# Patient Record
Sex: MALE | Race: AMERICAN INDIAN OR ALASKA NATIVE
[De-identification: names, ages, dates, MRNs, and addresses within clinical notes are randomized per-mention and may not be internally consistent; named-entity substitution may affect disease eponyms.]

---

## 2017-02-06 ENCOUNTER — HOSPITAL ENCOUNTER (EMERGENCY)
Dept: HOSPITAL 5 - ED | Age: 42
Discharge: HOME | End: 2017-02-06
Payer: COMMERCIAL

## 2017-02-06 VITALS — DIASTOLIC BLOOD PRESSURE: 66 MMHG | SYSTOLIC BLOOD PRESSURE: 143 MMHG

## 2017-02-06 DIAGNOSIS — Z87.891: ICD-10-CM

## 2017-02-06 DIAGNOSIS — M54.5: Primary | ICD-10-CM

## 2017-02-06 DIAGNOSIS — Z79.899: ICD-10-CM

## 2017-02-06 DIAGNOSIS — Z79.82: ICD-10-CM

## 2017-02-06 DIAGNOSIS — I10: ICD-10-CM

## 2017-02-06 DIAGNOSIS — Z87.828: ICD-10-CM

## 2017-02-06 DIAGNOSIS — G89.29: ICD-10-CM

## 2017-02-06 PROCEDURE — 99282 EMERGENCY DEPT VISIT SF MDM: CPT
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
## 2017-07-06 ENCOUNTER — HOSPITAL ENCOUNTER (EMERGENCY)
Dept: HOSPITAL 5 - ED | Age: 42
LOS: 1 days | Discharge: HOME | End: 2017-07-07
Payer: SELF-PAY

## 2017-07-06 DIAGNOSIS — J45.909: ICD-10-CM

## 2017-07-06 DIAGNOSIS — Z79.82: ICD-10-CM

## 2017-07-06 DIAGNOSIS — I10: ICD-10-CM

## 2017-07-06 DIAGNOSIS — K59.00: ICD-10-CM

## 2017-07-06 DIAGNOSIS — R07.89: Primary | ICD-10-CM

## 2017-07-06 DIAGNOSIS — Z88.6: ICD-10-CM

## 2017-07-06 LAB
ANION GAP SERPL CALC-SCNC: 16 MMOL/L
BASOPHILS NFR BLD AUTO: 0.3 % (ref 0–1.8)
BUN SERPL-MCNC: 12 MG/DL (ref 9–20)
BUN/CREAT SERPL: 15 %
CALCIUM SERPL-MCNC: 9.2 MG/DL (ref 8.4–10.2)
CHLORIDE SERPL-SCNC: 101.1 MMOL/L (ref 98–107)
CO2 SERPL-SCNC: 27 MMOL/L (ref 22–30)
EOSINOPHIL NFR BLD AUTO: 1.5 % (ref 0–4.3)
GLUCOSE SERPL-MCNC: 83 MG/DL (ref 75–100)
HCT VFR BLD CALC: 45 % (ref 35.5–45.6)
HGB BLD-MCNC: 15.2 GM/DL (ref 11.8–15.2)
MCH RBC QN AUTO: 31 PG (ref 28–32)
MCHC RBC AUTO-ENTMCNC: 34 % (ref 32–34)
MCV RBC AUTO: 92 FL (ref 84–94)
PLATELET # BLD: 264 K/MM3 (ref 140–440)
POTASSIUM SERPL-SCNC: 4.2 MMOL/L (ref 3.6–5)
RBC # BLD AUTO: 4.88 M/MM3 (ref 3.65–5.03)
SODIUM SERPL-SCNC: 140 MMOL/L (ref 137–145)
WBC # BLD AUTO: 8.4 K/MM3 (ref 4.5–11)

## 2017-07-06 PROCEDURE — 71275 CT ANGIOGRAPHY CHEST: CPT

## 2017-07-06 PROCEDURE — 84484 ASSAY OF TROPONIN QUANT: CPT

## 2017-07-06 PROCEDURE — 80048 BASIC METABOLIC PNL TOTAL CA: CPT

## 2017-07-06 PROCEDURE — 71020: CPT

## 2017-07-06 PROCEDURE — 36415 COLL VENOUS BLD VENIPUNCTURE: CPT

## 2017-07-06 PROCEDURE — 99285 EMERGENCY DEPT VISIT HI MDM: CPT

## 2017-07-06 PROCEDURE — 85379 FIBRIN DEGRADATION QUANT: CPT

## 2017-07-06 PROCEDURE — 93010 ELECTROCARDIOGRAM REPORT: CPT

## 2017-07-06 PROCEDURE — 74174 CTA ABD&PLVS W/CONTRAST: CPT

## 2017-07-06 PROCEDURE — 85025 COMPLETE CBC W/AUTO DIFF WBC: CPT

## 2017-07-06 PROCEDURE — 93005 ELECTROCARDIOGRAM TRACING: CPT

## 2017-07-06 PROCEDURE — 96374 THER/PROPH/DIAG INJ IV PUSH: CPT
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
## 2017-07-06 NOTE — CAT SCAN REPORT
FINAL REPORT



PROCEDURE:  CT ANGIO ABDOMEN PELVIS



TECHNIQUE:  Computerized axial tomographic angiography of the

abdomen and pelvis was performed after the IV injection of

iodinated nonionic contrast. The image data was postprocessed

using 3D MIP technique. 



HISTORY:  chest pain radiating to back sharp 



COMPARISON:  No prior studies are available for comparison.



FINDINGS:  

Liver and spleen appear normal. Gallbladder, pancreas, and

adrenal glands appear normal. No renal or bladder abnormality is

seen. There is no free pelvic fluid. Appendix is not seen but no

pericecal inflammation is seen. There may be mild constipation

but no suggestion of colitis is seen. No evidence of bowel

obstruction is seen. 



The abdominal aorta is normal in size. No stenosis is seen in the

aorta or iliac arteries. There is no narrowing of the renal

arteries. Accessory renal artery is seen inferiorly on the right.

Main left renal vein passes anterior to the aorta but the

accessory left renal vein passes posterior to the aorta. No

stenosis is seen of the SMA. Distal SMA branches appear to

enhance normally. 



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

narrowed by approximately 90 percent. It could be a congenital

variant, as no plaque is seen in this region. There is seen

likely mild poststenotic dilation. Small hiatus hernia is seen.

Shotty reactive lymph nodes are seen in the retroperitoneum and

small bowel mesentery. 



IMPRESSION:  

Prominent narrowing is seen in the origin of the proximal 6

millimeters of the celiac axis. This may be a congenital variant

as no associated plaque or extrinsic mass effect is seen. 



There is likely mild diffuse constipation.

## 2017-07-06 NOTE — CAT SCAN REPORT
FINAL REPORT



PROCEDURE:  CT ANGIO CHEST



TECHNIQUE:  Computerized tomographic angiography of the chest was

performed after the IV injection of iodinated nonionic contrast

including image processing. The image data was postprocessed

using 2-dimensional multiplanar reformatted (MPR) and

3-dimensional (MIP and/or volume rendered) techniques. 



HISTORY:  chest pain radiating to back sharp 



COMPARISON:  No prior studies are available for comparison.



FINDINGS:  

Likely mild atelectasis is seen in the right lung base laterally.

No pneumothorax or pleural effusion is seen. No mediastinal

lymphadenopathy is seen. Heart and thoracic aorta are normal in

size. No aortic dissection is seen. Possible small hiatus hernia

is seen. Timing of contrast bolus in the pulmonary arteries is

slightly suboptimal. No pulmonary embolus is seen. 



IMPRESSION:  

No pulmonary embolus is seen. 



Possible small hiatus hernia is seen.

## 2017-07-07 VITALS — SYSTOLIC BLOOD PRESSURE: 122 MMHG | DIASTOLIC BLOOD PRESSURE: 77 MMHG

## 2017-07-07 NOTE — XRAY REPORT
Chest 2 views: Compared to 12/28/15.



History: Chest pain.



Findings:



Normal cardiomediastinal silhouette. Trachea is midline. No 

consolidation, pneumothorax or pleural effusion.



Impression:



No acute cardiopulmonary findings.

## 2017-09-04 ENCOUNTER — HOSPITAL ENCOUNTER (EMERGENCY)
Dept: HOSPITAL 5 - ED | Age: 42
LOS: 1 days | Discharge: HOME | End: 2017-09-05
Payer: SELF-PAY

## 2017-09-04 DIAGNOSIS — F17.210: ICD-10-CM

## 2017-09-04 DIAGNOSIS — M54.6: ICD-10-CM

## 2017-09-04 DIAGNOSIS — G89.29: Primary | ICD-10-CM

## 2017-09-04 DIAGNOSIS — Z79.82: ICD-10-CM

## 2017-09-04 DIAGNOSIS — I10: ICD-10-CM

## 2017-09-04 DIAGNOSIS — J45.909: ICD-10-CM

## 2017-09-04 PROCEDURE — 99282 EMERGENCY DEPT VISIT SF MDM: CPT

## 2017-09-05 VITALS — DIASTOLIC BLOOD PRESSURE: 95 MMHG | SYSTOLIC BLOOD PRESSURE: 144 MMHG
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
## 2017-10-25 ENCOUNTER — HOSPITAL ENCOUNTER (EMERGENCY)
Dept: HOSPITAL 5 - ED | Age: 42
Discharge: HOME | End: 2017-10-25
Payer: SELF-PAY

## 2017-10-25 VITALS — DIASTOLIC BLOOD PRESSURE: 87 MMHG | SYSTOLIC BLOOD PRESSURE: 134 MMHG

## 2017-10-25 DIAGNOSIS — S39.012A: Primary | ICD-10-CM
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
## 2017-10-25 DIAGNOSIS — Z88.8: ICD-10-CM

## 2017-10-25 DIAGNOSIS — I10: ICD-10-CM

## 2017-10-25 DIAGNOSIS — J45.909: ICD-10-CM

## 2017-10-25 PROCEDURE — 99283 EMERGENCY DEPT VISIT LOW MDM: CPT

## 2017-10-25 PROCEDURE — 72100 X-RAY EXAM L-S SPINE 2/3 VWS: CPT

## 2017-10-25 NOTE — XRAY REPORT
FINAL REPORT



PROCEDURE:  XR SPINE LUMBOSACRAL 2-3V



TECHNIQUE:  Lumbar spine radiographs, including AP, lateral, and

lumbosacral spot views. CPT 39164 







HISTORY:  spinal tenderness 



COMPARISON:  No prior studies are available for comparison.



FINDINGS:  

The vertebral body heights and alignment are maintained. Disc

spaces are preserved. No scoliosis. No focal osseous lesion is

seen. 



IMPRESSION:  

No acute abnormality is identified.

## 2017-12-22 ENCOUNTER — HOSPITAL ENCOUNTER (EMERGENCY)
Dept: HOSPITAL 5 - ED | Age: 42
Discharge: STILL ADMITTED OR ADMINISTRATIVE | End: 2017-12-22
Payer: COMMERCIAL

## 2017-12-22 VITALS — DIASTOLIC BLOOD PRESSURE: 79 MMHG | SYSTOLIC BLOOD PRESSURE: 135 MMHG

## 2017-12-22 DIAGNOSIS — R07.89: Primary | ICD-10-CM

## 2017-12-22 DIAGNOSIS — I10: ICD-10-CM

## 2017-12-22 DIAGNOSIS — J45.909: ICD-10-CM

## 2017-12-22 DIAGNOSIS — G89.29: ICD-10-CM

## 2017-12-22 DIAGNOSIS — M62.82: ICD-10-CM

## 2017-12-22 LAB
ANION GAP SERPL CALC-SCNC: 17 MMOL/L
BASOPHILS NFR BLD AUTO: 0.4 % (ref 0–1.8)
BUN SERPL-MCNC: 7 MG/DL (ref 9–20)
BUN/CREAT SERPL: 10 %
CALCIUM SERPL-MCNC: 8.9 MG/DL (ref 8.4–10.2)
CHLORIDE SERPL-SCNC: 102.1 MMOL/L (ref 98–107)
CK SERPL-CCNC: 3201 UNITS/L (ref 55–170)
CO2 SERPL-SCNC: 25 MMOL/L (ref 22–30)
EOSINOPHIL NFR BLD AUTO: 1.9 % (ref 0–4.3)
GLUCOSE SERPL-MCNC: 102 MG/DL (ref 75–100)
HCT VFR BLD CALC: 43.5 % (ref 35.5–45.6)
HGB BLD-MCNC: 14.9 GM/DL (ref 11.8–15.2)
MCH RBC QN AUTO: 32 PG (ref 28–32)
MCHC RBC AUTO-ENTMCNC: 34 % (ref 32–34)
MCV RBC AUTO: 93 FL (ref 84–94)
PLATELET # BLD: 219 K/MM3 (ref 140–440)
POTASSIUM SERPL-SCNC: 4 MMOL/L (ref 3.6–5)
RBC # BLD AUTO: 4.7 M/MM3 (ref 3.65–5.03)
SODIUM SERPL-SCNC: 140 MMOL/L (ref 137–145)
WBC # BLD AUTO: 13.3 K/MM3 (ref 4.5–11)

## 2017-12-22 PROCEDURE — 85379 FIBRIN DEGRADATION QUANT: CPT

## 2017-12-22 PROCEDURE — 71020: CPT

## 2017-12-22 PROCEDURE — 83880 ASSAY OF NATRIURETIC PEPTIDE: CPT

## 2017-12-22 PROCEDURE — 82550 ASSAY OF CK (CPK): CPT

## 2017-12-22 PROCEDURE — 94640 AIRWAY INHALATION TREATMENT: CPT

## 2017-12-22 PROCEDURE — 85025 COMPLETE CBC W/AUTO DIFF WBC: CPT

## 2017-12-22 PROCEDURE — 36415 COLL VENOUS BLD VENIPUNCTURE: CPT

## 2017-12-22 PROCEDURE — 82553 CREATINE MB FRACTION: CPT

## 2017-12-22 PROCEDURE — 96361 HYDRATE IV INFUSION ADD-ON: CPT

## 2017-12-22 PROCEDURE — 84484 ASSAY OF TROPONIN QUANT: CPT

## 2017-12-22 PROCEDURE — 87040 BLOOD CULTURE FOR BACTERIA: CPT

## 2017-12-22 PROCEDURE — 96365 THER/PROPH/DIAG IV INF INIT: CPT

## 2017-12-22 PROCEDURE — 93010 ELECTROCARDIOGRAM REPORT: CPT

## 2017-12-22 PROCEDURE — 80048 BASIC METABOLIC PNL TOTAL CA: CPT

## 2017-12-22 PROCEDURE — 99284 EMERGENCY DEPT VISIT MOD MDM: CPT

## 2017-12-22 PROCEDURE — 93005 ELECTROCARDIOGRAM TRACING: CPT

## 2017-12-22 NOTE — XRAY REPORT
CHEST 2 VIEWS



INDICATION: Shortness of breath.



COMPARISON: 11/05/2017



FINDINGS: PA and lateral chest radiographs again demonstrate normal 

cardiomediastinal silhouette, clear lungs and few shrapnels over the 

left chest.  Intact bones. 



CONCLUSION: No acute disease, stable.



Thank you for the opportunity to participate in this patient's care.

## 2017-12-22 NOTE — HISTORY AND PHYSICAL REPORT
History of Present Illness


Chief complaint: 





Im coughing, and my chest hurts 


History of present illness: 


41 YO Male HTN, Asthma presents to ED for evaluation. Pt seen and evaluated in 

ED for atypical chest pain and productive cough suspicious for Atypical 

pneumonia. Pt treated with cardiac enzyme, ekg, and telemetry monitoring which 

was not indicative of acute ischemia. Pt treated with Iv abx and IVF with 

resolution of symptoms. Pt medically optimized and back to usual state of 

health. Pt discharged home and instructed to f/u pcp 1wk. 





Past History


Past Medical History: other (asthma)


Past Surgical History: No surgical history, Other (reviewed)


Social history: , lives with family.  denies: smoking, alcohol abuse, 

prescription drug abuse


Family history: no significant family history





Medications and Allergies


 Allergies











Allergy/AdvReac Type Severity Reaction Status Date / Time


 


ibuprofen Allergy  Unknown Verified 11/07/17 14:56











 Home Medications











 Medication  Instructions  Recorded  Confirmed  Last Taken  Type


 


Ciprofloxacin HCl [Ciprofloxacin 500 mg PO Q12H #14 tab 12/22/17  Unknown Rx





TAB]     











Active Meds: 


Active Medications





Sodium Chloride (Nacl 0.9% 1000 Ml)  1,000 mls @ 250 mls/hr IV ONCE ONE


   Stop: 12/22/17 17:56


   Last Admin: 12/22/17 15:15 Dose:  250 mls/hr











Review of Systems


Constitutional: no weight loss, no weight gain, no fever, no chills


Ears, nose, mouth and throat: no ear pain, no ear discharge, no tinnitis, no 

decreased hearing, no nose pain, no nasal congestion


Respiratory: cough, no hemoptysis, no shortness of breath, no dyspnea on 

exertion


Gastrointestinal: no abdominal pain, no nausea, no vomiting, no diarrhea


Genitourinary Male: no hematuria, no flank pain, no discharge, no urinary 

frequency


Rectal: no pain, no incontinence, no bleeding


Musculoskeletal: no neck stiffness, no neck pain, no shooting arm pain, no arm 

numbness/tingling, no low back pain


Integumentary: no rash, no pruritis, no redness, no sores, no wounds, no 

jaundice


Neurological: no transient paralysis, no paralysis, no weakness, no parathesias

, no numbness, no tingling, no seizures


Psychiatric: no anxiety, no memory loss, no change in sleep habits, no 

hypersomnia, no change in appetite, no change in libido


Endocrine: no cold intolerance, no heat intolerance, no polyphagia, no 

excessive thirst, no polydipsia


Hematologic/Lymphatic: no easy bruising, no easy bleeding


Allergic/Immunologic: no urticaria, no allergic rhinitis, no wheezing





Exam





- Constitutional


Vitals: 


 











Temp Pulse Resp BP Pulse Ox


 


 99.2 F   86   16   126/84   97 


 


 12/22/17 13:19  12/22/17 14:53  12/22/17 13:36  12/22/17 14:53  12/22/17 13:19











General appearance: Present: no acute distress, well-nourished





- EENT


Eyes: Present: PERRL


ENT: hearing intact, clear oral mucosa





- Neck


Neck: Present: supple, normal ROM





- Respiratory


Respiratory effort: normal


Respiratory: bilateral: CTA





- Cardiovascular


Heart Sounds: Present: S1 & S2.  Absent: rub, click





- Extremities


Extremities: pulses symmetrical, No edema


Peripheral Pulses: within normal limits





- Abdominal


General gastrointestinal: Present: soft, non-tender, non-distended, normal 

bowel sounds


Male genitourinary: Present: normal





- Integumentary


Integumentary: Present: clear, warm, dry





- Musculoskeletal


Musculoskeletal: gait normal, strength equal bilaterally





- Psychiatric


Psychiatric: appropriate mood/affect, intact judgment & insight





- Neurologic


Neurologic: CNII-XII intact, moves all extremities





Results





- Labs


CBC & Chem 7: 


 12/22/17 08:29





 12/22/17 08:29


Labs: 


 Abnormal lab results











  12/22/17 12/22/17 12/22/17 Range/Units





  08:29 08:29 Unknown 


 


WBC  13.3 H    (4.5-11.0)  K/mm3


 


Lymph % (Auto)  9.4 L    (13.4-35.0)  %


 


Seg Neutrophils %  82.4 H    (40.0-70.0)  %


 


Seg Neutrophils #  11.0 H    (1.8-7.7)  K/mm3


 


BUN   7 L   (9-20)  mg/dL


 


Creatinine   0.7 L   (0.8-1.5)  mg/dL


 


Glucose   102 H   ()  mg/dL


 


Total Creatine Kinase    3201 H  ()  units/L


 


CK-MB (CK-2)    11.2 H  (0.0-4.0)  ng/mL














Assessment and Plan





- Patient Problems


(1) Atypical pneumonia


Current Visit: Yes   Status: Acute   


Plan to address problem: 


IV abx, CXR, Pt discharged home with oral abx, and instructed to f/u pcp within 

1wk. 








(2) Atypical chest pain


Current Visit: No   Status: Acute   


Plan to address problem: 


secondary to atypical pneumonia, and persistent cough.
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
## 2018-01-04 ENCOUNTER — HOSPITAL ENCOUNTER (EMERGENCY)
Dept: HOSPITAL 5 - ED | Age: 43
Discharge: LEFT BEFORE DISCHARGE | End: 2018-01-04
Payer: SELF-PAY

## 2018-01-04 DIAGNOSIS — Z53.21: Primary | ICD-10-CM

## 2018-03-31 ENCOUNTER — HOSPITAL ENCOUNTER (INPATIENT)
Dept: HOSPITAL 5 - ED | Age: 43
LOS: 2 days | Discharge: HOME | DRG: 392 | End: 2018-04-02
Attending: INTERNAL MEDICINE | Admitting: INTERNAL MEDICINE
Payer: COMMERCIAL

## 2018-03-31 DIAGNOSIS — E86.0: ICD-10-CM

## 2018-03-31 DIAGNOSIS — E87.2: ICD-10-CM

## 2018-03-31 DIAGNOSIS — J45.909: ICD-10-CM

## 2018-03-31 DIAGNOSIS — M54.10: ICD-10-CM

## 2018-03-31 DIAGNOSIS — Z87.891: ICD-10-CM

## 2018-03-31 DIAGNOSIS — I10: ICD-10-CM

## 2018-03-31 DIAGNOSIS — Z88.8: ICD-10-CM

## 2018-03-31 DIAGNOSIS — M54.32: ICD-10-CM

## 2018-03-31 DIAGNOSIS — I77.4: Primary | ICD-10-CM

## 2018-03-31 LAB
APTT BLD: 27.2 SEC. (ref 24.2–36.6)
BACTERIA #/AREA URNS HPF: (no result) /HPF
BASOPHILS # (AUTO): 0 K/MM3 (ref 0–0.1)
BASOPHILS NFR BLD AUTO: 0.5 % (ref 0–1.8)
BILIRUB UR QL STRIP: (no result)
BLOOD UR QL VISUAL: (no result)
BUN SERPL-MCNC: 10 MG/DL (ref 9–20)
BUN/CREAT SERPL: 13 %
CALCIUM SERPL-MCNC: 8.9 MG/DL (ref 8.4–10.2)
EOSINOPHIL # BLD AUTO: 0.2 K/MM3 (ref 0–0.4)
EOSINOPHIL NFR BLD AUTO: 2.3 % (ref 0–4.3)
HCT VFR BLD CALC: 50.7 % (ref 35.5–45.6)
HEMOLYSIS INDEX: 10
HGB BLD-MCNC: 17.4 GM/DL (ref 11.8–15.2)
INR PPP: 0.93 (ref 0.87–1.13)
LYMPHOCYTES # BLD AUTO: 2.5 K/MM3 (ref 1.2–5.4)
LYMPHOCYTES NFR BLD AUTO: 29.2 % (ref 13.4–35)
MCH RBC QN AUTO: 31 PG (ref 28–32)
MCHC RBC AUTO-ENTMCNC: 34 % (ref 32–34)
MCV RBC AUTO: 91 FL (ref 84–94)
MONOCYTES # (AUTO): 1 K/MM3 (ref 0–0.8)
MONOCYTES % (AUTO): 11.2 % (ref 0–7.3)
MUCOUS THREADS #/AREA URNS HPF: (no result) /HPF
PH UR STRIP: 5 [PH] (ref 5–7)
PLATELET # BLD: 247 K/MM3 (ref 140–440)
PROT UR STRIP-MCNC: (no result) MG/DL
RBC # BLD AUTO: 5.56 M/MM3 (ref 3.65–5.03)
RBC #/AREA URNS HPF: 14 /HPF (ref 0–6)
UROBILINOGEN UR-MCNC: < 2 MG/DL (ref ?–2)
WBC #/AREA URNS HPF: 1 /HPF (ref 0–6)

## 2018-03-31 PROCEDURE — 86850 RBC ANTIBODY SCREEN: CPT

## 2018-03-31 PROCEDURE — 85025 COMPLETE CBC W/AUTO DIFF WBC: CPT

## 2018-03-31 PROCEDURE — 85610 PROTHROMBIN TIME: CPT

## 2018-03-31 PROCEDURE — 36415 COLL VENOUS BLD VENIPUNCTURE: CPT

## 2018-03-31 PROCEDURE — 86900 BLOOD TYPING SEROLOGIC ABO: CPT

## 2018-03-31 PROCEDURE — 85730 THROMBOPLASTIN TIME PARTIAL: CPT

## 2018-03-31 PROCEDURE — 86901 BLOOD TYPING SEROLOGIC RH(D): CPT

## 2018-03-31 PROCEDURE — 74174 CTA ABD&PLVS W/CONTRAST: CPT

## 2018-03-31 PROCEDURE — 80048 BASIC METABOLIC PNL TOTAL CA: CPT

## 2018-03-31 PROCEDURE — 72040 X-RAY EXAM NECK SPINE 2-3 VW: CPT

## 2018-03-31 PROCEDURE — 96374 THER/PROPH/DIAG INJ IV PUSH: CPT

## 2018-03-31 PROCEDURE — 82140 ASSAY OF AMMONIA: CPT

## 2018-03-31 PROCEDURE — 82550 ASSAY OF CK (CPK): CPT

## 2018-03-31 PROCEDURE — 72148 MRI LUMBAR SPINE W/O DYE: CPT

## 2018-03-31 PROCEDURE — 72100 X-RAY EXAM L-S SPINE 2/3 VWS: CPT

## 2018-03-31 PROCEDURE — 96361 HYDRATE IV INFUSION ADD-ON: CPT

## 2018-03-31 PROCEDURE — 96375 TX/PRO/DX INJ NEW DRUG ADDON: CPT

## 2018-03-31 PROCEDURE — 81001 URINALYSIS AUTO W/SCOPE: CPT

## 2018-03-31 PROCEDURE — 80053 COMPREHEN METABOLIC PANEL: CPT

## 2018-03-31 RX ADMIN — Medication SCH ML: at 22:59

## 2018-03-31 RX ADMIN — DEXTROSE AND SODIUM CHLORIDE SCH MLS/HR: 5; .9 INJECTION, SOLUTION INTRAVENOUS at 22:58

## 2018-03-31 RX ADMIN — HYDROMORPHONE HYDROCHLORIDE PRN MG: 1 INJECTION, SOLUTION INTRAMUSCULAR; INTRAVENOUS; SUBCUTANEOUS at 22:58
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
## 2018-03-31 NOTE — XRAY REPORT
AP AND LATERAL LUMBOSACRAL SPINE:



History: Lower back pain, sciatica



The vertebral bodies are well mineralized and normal in alignment

and vertebral height with well preserved interspace distances.  The 

visualized portions of the posterior elements are normal.



IMPRESSION:

Lumbar spine within normal limits.

## 2018-03-31 NOTE — CAT SCAN REPORT
FINAL REPORT



EXAM:  CT ANGIO ABDOMEN PELVIS



HISTORY:  lower abd pain, hx of celiac stenosis 



TECHNIQUE:  Multiple sections through the abdomen after IV

contrast 



Multiple sections through the pelvis after IV contrast



CT angiographic image post processing of the abdomen and pelvis.



PRIORS:  7/6/2017



FINDINGS:  

Normal-appearing liver, gallbladder, adrenals, pancreas, and

spleen normal caliber IVC. Normal-appearing kidneys and ureters



Small fat containing umbilical hernia very small fat containing

inguinal hernia bilaterally. No retroperitoneal adenopathy. No

evidence of mesenteric mass. Normal-appearing stomach and

duodenum. No small bowel distention in the abdomen and pelvis.



No pelvic free fluid. Normal-appearing urinary bladder, prostate,

seminal vesicles, and rectum. No definite sigmoid colon

abnormality. No gross ascites, free air, or colonic distention.

Normal-appearing cecum and terminal ileum. Appendix not visible.

Suture line at the cecal tip may reflect prior appendectomy.



CTA images demonstrate normal caliber abdominal aorta without

aneurysm or dissection. Diffusely patent SMA and renal arteries.

Patent ROJELIO.



Once again, there is approximately 90 % diameter severe narrowing

at the proximal aspect of the celiac artery involving a segment

of approximately 6 mm long. The narrowing is noted just distal to

the origin



IMPRESSION:  

Again noted is severe narrowing at the proximal celiac artery,

approximately 90 % diameter over a 6 mm long segment



Small fat containing umbilical hernia and very small fat

containing inguinal hernias
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
## 2018-04-01 LAB
ALBUMIN SERPL-MCNC: 3.8 G/DL (ref 3.9–5)
ALT SERPL-CCNC: 19 UNITS/L (ref 7–56)
BASOPHILS # (AUTO): 0 K/MM3 (ref 0–0.1)
BASOPHILS NFR BLD AUTO: 0.5 % (ref 0–1.8)
BUN SERPL-MCNC: 8 MG/DL (ref 9–20)
BUN/CREAT SERPL: 10 %
CALCIUM SERPL-MCNC: 8.6 MG/DL (ref 8.4–10.2)
EOSINOPHIL # BLD AUTO: 0.1 K/MM3 (ref 0–0.4)
EOSINOPHIL NFR BLD AUTO: 1.9 % (ref 0–4.3)
HCT VFR BLD CALC: 51.3 % (ref 35.5–45.6)
HEMOLYSIS INDEX: 19
HGB BLD-MCNC: 17.3 GM/DL (ref 11.8–15.2)
LYMPHOCYTES # BLD AUTO: 2.5 K/MM3 (ref 1.2–5.4)
LYMPHOCYTES NFR BLD AUTO: 33.8 % (ref 13.4–35)
MCH RBC QN AUTO: 31 PG (ref 28–32)
MCHC RBC AUTO-ENTMCNC: 34 % (ref 32–34)
MCV RBC AUTO: 93 FL (ref 84–94)
MONOCYTES # (AUTO): 0.7 K/MM3 (ref 0–0.8)
MONOCYTES % (AUTO): 9.9 % (ref 0–7.3)
PLATELET # BLD: 245 K/MM3 (ref 140–440)
RBC # BLD AUTO: 5.5 M/MM3 (ref 3.65–5.03)

## 2018-04-01 RX ADMIN — HYDROMORPHONE HYDROCHLORIDE PRN MG: 1 INJECTION, SOLUTION INTRAMUSCULAR; INTRAVENOUS; SUBCUTANEOUS at 21:41

## 2018-04-01 RX ADMIN — LOSARTAN POTASSIUM SCH MG: 50 TABLET, FILM COATED ORAL at 09:33

## 2018-04-01 RX ADMIN — DEXTROSE AND SODIUM CHLORIDE SCH MLS/HR: 5; .9 INJECTION, SOLUTION INTRAVENOUS at 06:44

## 2018-04-01 RX ADMIN — HYDROMORPHONE HYDROCHLORIDE PRN MG: 1 INJECTION, SOLUTION INTRAMUSCULAR; INTRAVENOUS; SUBCUTANEOUS at 14:59

## 2018-04-01 RX ADMIN — HYDROCODONE BITARTRATE AND ACETAMINOPHEN PRN EACH: 5; 325 TABLET ORAL at 10:01

## 2018-04-01 RX ADMIN — Medication SCH ML: at 09:34

## 2018-04-01 RX ADMIN — Medication SCH ML: at 21:39

## 2018-04-01 RX ADMIN — HYDROMORPHONE HYDROCHLORIDE PRN MG: 1 INJECTION, SOLUTION INTRAMUSCULAR; INTRAVENOUS; SUBCUTANEOUS at 04:35

## 2018-04-01 RX ADMIN — DEXTROSE AND SODIUM CHLORIDE SCH MLS/HR: 5; .9 INJECTION, SOLUTION INTRAVENOUS at 14:53

## 2018-04-01 NOTE — PROGRESS NOTE
Assessment and Plan


Assessment and plan: 


--Celiac artery stenosis; on CTA abdomen


Vascular evaluated the patient, no intervention needed advised aspirin





--Low back pain; possible radiculopathy


Pending MRI lumbosacral spine, and medications


Ambulate as tolerated





--Hypertension; moderate control


Continue current antihypertensives and when necessary medications





--DVT prophylaxis; heparin





Closely monitor the patient and adjust management as needed


Patient may need to follow neurosurgery/orthopedic for further evaluation of 

his low back pain


Possible discharge in 1-2 days if stabl





History


Interval history: 


Patient seen and examined medical records reviewed


Vascular evaluation and recommendations noted and appreciated


MRI lumbosacral spine pending


Patiently slightly but pain slightly improved


Metabolic oriented 3 not in acute distress








Hospitalist Physical





- Constitutional


Vitals: 


 











Temp Pulse Resp BP Pulse Ox


 


 98.3 F   54 L  20   140/75   100 


 


 04/01/18 08:00  04/01/18 08:00  04/01/18 08:00  04/01/18 09:33  04/01/18 08:00











General appearance: Present: no acute distress, well-nourished, obese





- EENT


Eyes: Present: PERRL, EOM intact





- Neck


Neck: Present: supple, normal ROM





- Respiratory


Respiratory effort: normal


Respiratory: bilateral: diminished, negative: rales, rhonchi, wheezing





- Cardiovascular


Rhythm: regular


Heart Sounds: Present: S1 & S2





- Extremities


Extremities: no ischemia, No edema





- Abdominal


General gastrointestinal: soft, non-tender, non-distended, normal bowel sounds





- Integumentary


Integumentary: Present: clear, warm





- Psychiatric


Psychiatric: appropriate mood/affect, cooperative





- Neurologic


Neurologic: CNII-XII intact, moves all extremities





Results





- Labs


CBC & Chem 7: 


 04/01/18 05:46





 04/01/18 05:46


Labs: 


 Laboratory Last Values











WBC  7.3 K/mm3 (4.5-11.0)   04/01/18  05:46    


 


RBC  5.50 M/mm3 (3.65-5.03)  H  04/01/18  05:46    


 


Hgb  17.3 gm/dl (11.8-15.2)  H  04/01/18  05:46    


 


Hct  51.3 % (35.5-45.6)  H  04/01/18  05:46    


 


MCV  93 fl (84-94)   04/01/18  05:46    


 


MCH  31 pg (28-32)   04/01/18  05:46    


 


MCHC  34 % (32-34)   04/01/18  05:46    


 


RDW  14.1 % (13.2-15.2)   04/01/18  05:46    


 


Plt Count  245 K/mm3 (140-440)   04/01/18  05:46    


 


Lymph % (Auto)  33.8 % (13.4-35.0)   04/01/18  05:46    


 


Mono % (Auto)  9.9 % (0.0-7.3)  H  04/01/18  05:46    


 


Eos % (Auto)  1.9 % (0.0-4.3)   04/01/18  05:46    


 


Baso % (Auto)  0.5 % (0.0-1.8)   04/01/18  05:46    


 


Lymph #  2.5 K/mm3 (1.2-5.4)   04/01/18  05:46    


 


Mono #  0.7 K/mm3 (0.0-0.8)   04/01/18  05:46    


 


Eos #  0.1 K/mm3 (0.0-0.4)   04/01/18  05:46    


 


Baso #  0.0 K/mm3 (0.0-0.1)   04/01/18  05:46    


 


Seg Neutrophils %  53.9 % (40.0-70.0)   04/01/18  05:46    


 


Seg Neutrophils #  3.9 K/mm3 (1.8-7.7)   04/01/18  05:46    


 


PT  12.9 Sec. (12.2-14.9)   03/31/18  13:28    


 


INR  0.93  (0.87-1.13)   03/31/18  13:28    


 


APTT  27.2 Sec. (24.2-36.6)   03/31/18  13:28    


 


Sodium  142 mmol/L (137-145)   04/01/18  05:46    


 


Potassium  4.0 mmol/L (3.6-5.0)   04/01/18  05:46    


 


Chloride  100.7 mmol/L ()   04/01/18  05:46    


 


Carbon Dioxide  30 mmol/L (22-30)   04/01/18  05:46    


 


Anion Gap  15 mmol/L  04/01/18  05:46    


 


BUN  8 mg/dL (9-20)  L  04/01/18  05:46    


 


Creatinine  0.8 mg/dL (0.8-1.5)   04/01/18  05:46    


 


Estimated GFR  > 60 ml/min  04/01/18  05:46    


 


BUN/Creatinine Ratio  10 %  04/01/18  05:46    


 


Glucose  87 mg/dL ()   04/01/18  05:46    


 


Lactic Acid  2.60 mmol/L (0.7-2.0)  H*  03/31/18  16:52    


 


Calcium  8.6 mg/dL (8.4-10.2)   04/01/18  05:46    


 


Total Bilirubin  0.80 mg/dL (0.1-1.2)   04/01/18  05:46    


 


AST  20 units/L (5-40)   04/01/18  05:46    


 


ALT  19 units/L (7-56)   04/01/18  05:46    


 


Alkaline Phosphatase  60 units/L ()   04/01/18  05:46    


 


Total Creatine Kinase  294 units/L ()  H  03/31/18  11:39    


 


Total Protein  6.9 g/dL (6.3-8.2)   04/01/18  05:46    


 


Albumin  3.8 g/dL (3.9-5)  L  04/01/18  05:46    


 


Albumin/Globulin Ratio  1.2 %  04/01/18  05:46    


 


Urine Color  Yellow  (Yellow)   03/31/18  10:34    


 


Urine Turbidity  Clear  (Clear)   03/31/18  10:34    


 


Urine pH  5.0  (5.0-7.0)   03/31/18  10:34    


 


Ur Specific Gravity  1.020  (1.003-1.030)   03/31/18  10:34    


 


Urine Protein  <15 mg/dl mg/dL (Negative)   03/31/18  10:34    


 


Urine Glucose (UA)  Neg mg/dL (Negative)   03/31/18  10:34    


 


Urine Ketones  Neg mg/dL (Negative)   03/31/18  10:34    


 


Urine Blood  Mod  (Negative)   03/31/18  10:34    


 


Urine Nitrite  Neg  (Negative)   03/31/18  10:34    


 


Urine Bilirubin  Neg  (Negative)   03/31/18  10:34    


 


Urine Urobilinogen  < 2.0 mg/dL (<2.0)   03/31/18  10:34    


 


Ur Leukocyte Esterase  Neg  (Negative)   03/31/18  10:34    


 


Urine WBC (Auto)  1.0 /HPF (0.0-6.0)   03/31/18  10:34    


 


Urine RBC (Auto)  14.0 /HPF (0.0-6.0)   03/31/18  10:34    


 


U Epithel Cells (Auto)  < 1.0 /HPF (0-13.0)   03/31/18  10:34    


 


Urine Bacteria (Auto)  1+ /HPF (Negative)   03/31/18  10:34    


 


Urine Mucus  Few /HPF  03/31/18  10:34    


 


Blood Type  A POSITIVE   03/31/18  13:28    


 


Antibody Screen  Negative   03/31/18  13:28
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
## 2018-04-02 VITALS — DIASTOLIC BLOOD PRESSURE: 82 MMHG | SYSTOLIC BLOOD PRESSURE: 138 MMHG

## 2018-04-02 RX ADMIN — HYDROMORPHONE HYDROCHLORIDE PRN MG: 1 INJECTION, SOLUTION INTRAMUSCULAR; INTRAVENOUS; SUBCUTANEOUS at 03:27

## 2018-04-02 RX ADMIN — LOSARTAN POTASSIUM SCH MG: 50 TABLET, FILM COATED ORAL at 11:02

## 2018-04-02 RX ADMIN — HYDROMORPHONE HYDROCHLORIDE PRN MG: 1 INJECTION, SOLUTION INTRAMUSCULAR; INTRAVENOUS; SUBCUTANEOUS at 08:59

## 2018-04-02 RX ADMIN — Medication SCH ML: at 09:00

## 2018-04-02 RX ADMIN — DEXTROSE AND SODIUM CHLORIDE SCH MLS/HR: 5; .9 INJECTION, SOLUTION INTRAVENOUS at 03:19

## 2018-04-02 RX ADMIN — HYDROCODONE BITARTRATE AND ACETAMINOPHEN PRN EACH: 5; 325 TABLET ORAL at 17:19

## 2018-04-02 NOTE — MAGNETIC RESONANCE REPORT
FINAL REPORT



EXAM:  MR LUMBAR SPINE WO CON



HISTORY:  Lumbar radiculopathy 



TECHNIQUE:  MRI lumbar spine without contrast 



PRIORS:  None.



FINDINGS:  

Distal cord and conus demonstrate normal signal intensity 



From T12-L1 through L4-5 there is no focal disc herniation or

bulge there is no evidence for canal or foraminal stenosis 



There is a moderate broad-based central posterior disc herniation

of the protrusion type at L5-S1. Disc contacts the anterior

aspect of the thecal sac and the S1 nerve roots at the lateral

recesses. 



There is signal change within S1 vertebral body which is low

signal on T1 and high signal on T2 representing Modic type 1

changes 



There is a small focus of endplate signal seen at the posterior

inferior aspect of the L5 vertebral body consistent with Modic

type 2 changes. 



There is no evidence for increased signal within the disc to

suggest presence of discitis. 



IMPRESSION:  

Posterior disc herniation at L5-S1 with Modic 1 signal changes S1

vertebral body and small focus of Modic type 2 signal change L5

vertebral body.
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
## 2018-04-02 NOTE — DISCHARGE SUMMARY
Providers





- Providers


Date of Admission: 


03/31/18 16:39





Date of discharge: 04/02/18


Attending physician: 


AMY J KOCHERLA





 





03/31/18 21:02


Consult to Physician [CONS] Routine 


   Comment: 


   Consulting Provider: JOSSE URBAN


   Physician Instructions: 


   Reason For Exam: Celiac artery occlusion











Primary care physician: 


PRIMARY CARE MD








Hospitalization


Condition: Stable


Disposition: DC-01 TO HOME OR SELFCARE


Time spent for discharge: 31 min





Core Measure Documentation





- Palliative Care


Palliative Care/ Comfort Measures: Not Applicable





- Core Measures


Any of the following diagnoses?: none





Exam





- Constitutional


Vitals: 


 











Temp Pulse Resp BP Pulse Ox


 


 98.5 F   58 L  20   147/62   99 


 


 04/02/18 11:27  04/02/18 11:27  04/02/18 11:27  04/02/18 11:27  04/02/18 11:27











General appearance: Present: no acute distress, well-nourished





- EENT


Eyes: Present: PERRL, EOM intact





- Neck


Neck: Present: supple, normal ROM





- Respiratory


Respiratory effort: normal


Respiratory: bilateral: diminished, negative: rales, rhonchi, wheezing





- Cardiovascular


Rhythm: regular


Heart Sounds: Present: S1 & S2





- Extremities


Extremities: no ischemia, No edema





- Abdominal


General gastrointestinal: Present: soft, non-tender, non-distended, normal 

bowel sounds





- Integumentary


Integumentary: Present: clear, warm





- Musculoskeletal


Musculoskeletal: strength equal bilaterally





- Psychiatric


Psychiatric: appropriate mood/affect, cooperative





- Neurologic


Neurologic: CNII-XII intact, moves all extremities





Plan


Activity: no restrictions


Diet: regular


Additional Instructions: Advised to see private neurosurgeon or orthopedic 

surgeon.  For further evaluation and management


Follow up with: 


[facility] [Outside] - 3-5 Days


[facility] [Outside] - 3-5 Days


USHA SHAFFER MD [Staff Physician] - 3-5 Days


ARABELLA LACY MD [Staff Physician] - 7 Days


Prescriptions: 


HYDROcodone/APAP 5-325 [Norco 5-325 mg TAB] 1 each PO BID PRN #10 tablet


 PRN Reason: Pain, Moderate (4-6)


Losartan [Cozaar] 50 mg PO QDAY #30 tablet

## 2018-04-26 ENCOUNTER — HOSPITAL ENCOUNTER (EMERGENCY)
Dept: HOSPITAL 5 - ED | Age: 43
Discharge: HOME | End: 2018-04-26
Payer: COMMERCIAL

## 2018-04-26 VITALS — DIASTOLIC BLOOD PRESSURE: 103 MMHG | SYSTOLIC BLOOD PRESSURE: 153 MMHG

## 2018-04-26 DIAGNOSIS — M54.32: Primary | ICD-10-CM

## 2018-04-26 DIAGNOSIS — Z88.8: ICD-10-CM

## 2018-04-26 PROCEDURE — 99282 EMERGENCY DEPT VISIT SF MDM: CPT

## 2020-02-05 ENCOUNTER — HOSPITAL ENCOUNTER (EMERGENCY)
Dept: HOSPITAL 5 - ED | Age: 45
Discharge: HOME | End: 2020-02-05
Payer: SELF-PAY

## 2020-02-05 VITALS — SYSTOLIC BLOOD PRESSURE: 116 MMHG | DIASTOLIC BLOOD PRESSURE: 76 MMHG

## 2020-02-05 DIAGNOSIS — Z88.6: ICD-10-CM

## 2020-02-05 DIAGNOSIS — Z79.899: ICD-10-CM

## 2020-02-05 DIAGNOSIS — F11.10: ICD-10-CM

## 2020-02-05 DIAGNOSIS — J45.909: ICD-10-CM

## 2020-02-05 DIAGNOSIS — F32.9: ICD-10-CM

## 2020-02-05 DIAGNOSIS — R41.82: Primary | ICD-10-CM

## 2020-02-05 DIAGNOSIS — I10: ICD-10-CM

## 2020-02-05 LAB
ALBUMIN SERPL-MCNC: 3.8 G/DL (ref 3.9–5)
ALT SERPL-CCNC: 26 UNITS/L (ref 7–56)
BASOPHILS # (AUTO): 0.1 K/MM3 (ref 0–0.1)
BASOPHILS NFR BLD AUTO: 0.7 % (ref 0–1.8)
BENZODIAZEPINES SCREEN,URINE: (no result)
BILIRUB UR QL STRIP: (no result)
BLOOD UR QL VISUAL: (no result)
BUN SERPL-MCNC: 15 MG/DL (ref 9–20)
BUN/CREAT SERPL: 19 %
CALCIUM SERPL-MCNC: 8.4 MG/DL (ref 8.4–10.2)
EOSINOPHIL # BLD AUTO: 0.1 K/MM3 (ref 0–0.4)
EOSINOPHIL NFR BLD AUTO: 0.8 % (ref 0–4.3)
HCT VFR BLD CALC: 40.9 % (ref 35.5–45.6)
HEMOLYSIS INDEX: 17
HGB BLD-MCNC: 13.8 GM/DL (ref 11.8–15.2)
LYMPHOCYTES # BLD AUTO: 1.1 K/MM3 (ref 1.2–5.4)
LYMPHOCYTES NFR BLD AUTO: 11.1 % (ref 13.4–35)
MCHC RBC AUTO-ENTMCNC: 34 % (ref 32–34)
MCV RBC AUTO: 92 FL (ref 84–94)
METHADONE SCREEN,URINE: (no result)
MONOCYTES # (AUTO): 0.8 K/MM3 (ref 0–0.8)
MONOCYTES % (AUTO): 7.5 % (ref 0–7.3)
OPIATE SCREEN,URINE: (no result)
PH UR STRIP: 5 [PH] (ref 5–7)
PLATELET # BLD: 255 K/MM3 (ref 140–440)
PROT UR STRIP-MCNC: (no result) MG/DL
RBC # BLD AUTO: 4.43 M/MM3 (ref 3.65–5.03)
RBC #/AREA URNS HPF: 3 /HPF (ref 0–6)
UROBILINOGEN UR-MCNC: < 2 MG/DL (ref ?–2)
WBC #/AREA URNS HPF: < 1 /HPF (ref 0–6)

## 2020-02-05 PROCEDURE — 85025 COMPLETE CBC W/AUTO DIFF WBC: CPT

## 2020-02-05 PROCEDURE — 96374 THER/PROPH/DIAG INJ IV PUSH: CPT

## 2020-02-05 PROCEDURE — 99284 EMERGENCY DEPT VISIT MOD MDM: CPT

## 2020-02-05 PROCEDURE — 80307 DRUG TEST PRSMV CHEM ANLYZR: CPT

## 2020-02-05 PROCEDURE — 81001 URINALYSIS AUTO W/SCOPE: CPT

## 2020-02-05 PROCEDURE — 96361 HYDRATE IV INFUSION ADD-ON: CPT

## 2020-02-05 PROCEDURE — 80053 COMPREHEN METABOLIC PANEL: CPT

## 2020-02-05 PROCEDURE — 36415 COLL VENOUS BLD VENIPUNCTURE: CPT
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
## 2020-09-12 ENCOUNTER — HOSPITAL ENCOUNTER (EMERGENCY)
Dept: HOSPITAL 5 - ED | Age: 45
Discharge: HOME | End: 2020-09-12
Payer: SELF-PAY

## 2020-09-12 VITALS — SYSTOLIC BLOOD PRESSURE: 141 MMHG | DIASTOLIC BLOOD PRESSURE: 76 MMHG

## 2020-09-12 DIAGNOSIS — J96.00: ICD-10-CM

## 2020-09-12 DIAGNOSIS — F15.10: ICD-10-CM

## 2020-09-12 DIAGNOSIS — T40.1X1A: Primary | ICD-10-CM

## 2020-09-12 DIAGNOSIS — Z79.899: ICD-10-CM

## 2020-09-12 DIAGNOSIS — I10: ICD-10-CM

## 2020-09-12 DIAGNOSIS — T40.4X1A: ICD-10-CM
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
## 2020-09-12 DIAGNOSIS — J44.9: ICD-10-CM

## 2020-09-12 LAB
ALBUMIN SERPL-MCNC: 4.1 G/DL (ref 3.9–5)
ALT SERPL-CCNC: 18 UNITS/L (ref 7–56)
BASOPHILS # (AUTO): 0 K/MM3 (ref 0–0.1)
BASOPHILS NFR BLD AUTO: 0.5 % (ref 0–1.8)
BENZODIAZEPINES SCREEN,URINE: (no result)
BILIRUB DIRECT SERPL-MCNC: < 0.2 MG/DL (ref 0–0.2)
BILIRUB UR QL STRIP: (no result)
BLOOD UR QL VISUAL: (no result)
BUN SERPL-MCNC: 12 MG/DL (ref 9–20)
BUN/CREAT SERPL: 12 %
CALCIUM SERPL-MCNC: 9.3 MG/DL (ref 8.4–10.2)
EOSINOPHIL # BLD AUTO: 0.2 K/MM3 (ref 0–0.4)
EOSINOPHIL NFR BLD AUTO: 2.4 % (ref 0–4.3)
HCT VFR BLD CALC: 44.4 % (ref 35.5–45.6)
HEMOLYSIS INDEX: 4
HGB BLD-MCNC: 15 GM/DL (ref 11.8–15.2)
LYMPHOCYTES # BLD AUTO: 1.2 K/MM3 (ref 1.2–5.4)
LYMPHOCYTES NFR BLD AUTO: 18.9 % (ref 13.4–35)
MCHC RBC AUTO-ENTMCNC: 34 % (ref 32–34)
MCV RBC AUTO: 94 FL (ref 84–94)
METHADONE SCREEN,URINE: (no result)
MONOCYTES # (AUTO): 0.7 K/MM3 (ref 0–0.8)
MONOCYTES % (AUTO): 10.3 % (ref 0–7.3)
MUCOUS THREADS #/AREA URNS HPF: (no result) /HPF
OPIATE SCREEN,URINE: (no result)
PH UR STRIP: 7 [PH] (ref 5–7)
PLATELET # BLD: 245 K/MM3 (ref 140–440)
PROT UR STRIP-MCNC: (no result) MG/DL
RBC # BLD AUTO: 4.72 M/MM3 (ref 3.65–5.03)
RBC #/AREA URNS HPF: 5 /HPF (ref 0–6)
UROBILINOGEN UR-MCNC: < 2 MG/DL (ref ?–2)
WBC #/AREA URNS HPF: 1 /HPF (ref 0–6)

## 2020-09-12 PROCEDURE — 85025 COMPLETE CBC W/AUTO DIFF WBC: CPT

## 2020-09-12 PROCEDURE — 99284 EMERGENCY DEPT VISIT MOD MDM: CPT

## 2020-09-12 PROCEDURE — 96366 THER/PROPH/DIAG IV INF ADDON: CPT

## 2020-09-12 PROCEDURE — 80320 DRUG SCREEN QUANTALCOHOLS: CPT

## 2020-09-12 PROCEDURE — 81001 URINALYSIS AUTO W/SCOPE: CPT

## 2020-09-12 PROCEDURE — 36415 COLL VENOUS BLD VENIPUNCTURE: CPT

## 2020-09-12 PROCEDURE — 96361 HYDRATE IV INFUSION ADD-ON: CPT

## 2020-09-12 PROCEDURE — 80048 BASIC METABOLIC PNL TOTAL CA: CPT

## 2020-09-12 PROCEDURE — G0480 DRUG TEST DEF 1-7 CLASSES: HCPCS

## 2020-09-12 PROCEDURE — 96375 TX/PRO/DX INJ NEW DRUG ADDON: CPT

## 2020-09-12 PROCEDURE — 93005 ELECTROCARDIOGRAM TRACING: CPT

## 2020-09-12 PROCEDURE — 80307 DRUG TEST PRSMV CHEM ANLYZR: CPT

## 2020-09-12 PROCEDURE — 96365 THER/PROPH/DIAG IV INF INIT: CPT

## 2020-09-12 PROCEDURE — 80076 HEPATIC FUNCTION PANEL: CPT
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
## 2022-03-22 ENCOUNTER — HOSPITAL ENCOUNTER (INPATIENT)
Dept: HOSPITAL 5 - ED | Age: 47
LOS: 1 days | DRG: 208 | End: 2022-03-23
Attending: STUDENT IN AN ORGANIZED HEALTH CARE EDUCATION/TRAINING PROGRAM | Admitting: HOSPITALIST
Payer: SELF-PAY

## 2022-03-22 DIAGNOSIS — J96.00: Primary | ICD-10-CM

## 2022-03-22 DIAGNOSIS — E87.5: ICD-10-CM

## 2022-03-22 DIAGNOSIS — E87.2: ICD-10-CM

## 2022-03-22 DIAGNOSIS — R77.8: ICD-10-CM

## 2022-03-22 DIAGNOSIS — I10: ICD-10-CM

## 2022-03-22 DIAGNOSIS — I46.9: ICD-10-CM

## 2022-03-22 DIAGNOSIS — E87.8: ICD-10-CM

## 2022-03-22 DIAGNOSIS — I73.9: ICD-10-CM

## 2022-03-22 DIAGNOSIS — N17.9: ICD-10-CM

## 2022-03-22 DIAGNOSIS — G93.1: ICD-10-CM

## 2022-03-22 DIAGNOSIS — J44.9: ICD-10-CM

## 2022-03-22 DIAGNOSIS — I42.9: ICD-10-CM

## 2022-03-22 DIAGNOSIS — Z79.899: ICD-10-CM

## 2022-03-22 LAB
ALBUMIN SERPL-MCNC: 3.5 G/DL (ref 3.9–5)
ALT SERPL-CCNC: 174 UNITS/L (ref 7–56)
BAND NEUTROPHILS # (MANUAL): 0 K/MM3
BUN SERPL-MCNC: 18 MG/DL (ref 9–20)
BUN SERPL-MCNC: 22 MG/DL (ref 9–20)
BUN/CREAT SERPL: 11 %
BUN/CREAT SERPL: 12 %
CALCIUM SERPL-MCNC: 8.3 MG/DL (ref 8.4–10.2)
CALCIUM SERPL-MCNC: 8.9 MG/DL (ref 8.4–10.2)
HCO3 BLDA-SCNC: 26.7 MMOL/L (ref 20–26)
HCT VFR BLD CALC: 38.9 % (ref 35.5–45.6)
HDLC SERPL-MCNC: 54 MG/DL (ref 40–59)
HEMOLYSIS INDEX: 35
HEMOLYSIS INDEX: 6
HGB BLD-MCNC: 12.8 GM/DL (ref 11.8–15.2)
HYALINE CASTS #/AREA URNS LPF: 6 /LPF
INR PPP: 1.09 (ref 0.87–1.13)
MCHC RBC AUTO-ENTMCNC: 33 % (ref 32–34)
MCV RBC AUTO: 95 FL (ref 84–94)
MUCOUS THREADS #/AREA URNS HPF: (no result) /HPF
MYELOCYTES # (MANUAL): 0 K/MM3
PCO2 BLDA: 29.2 MM HG
PH BLDA: 7.58 PH UNITS (ref 7.35–7.45)
PH UR STRIP: 7 [PH] (ref 5–7)
PLATELET # BLD: 224 K/MM3 (ref 140–440)
PO2 BLDA: 206.2 MM HG (ref 80–90)
PROMYELOCYTES # (MANUAL): 0 K/MM3
PROT UR STRIP-MCNC: >500 MG/DL
RBC # BLD AUTO: 4.12 M/MM3 (ref 3.65–5.03)
RBC #/AREA URNS HPF: 16 /HPF (ref 0–6)
SPERM #/AREA URNS HPF: (no result) /HPF
TOTAL CELLS COUNTED BLD: 100
UROBILINOGEN UR-MCNC: < 2 MG/DL (ref ?–2)
WBC #/AREA URNS HPF: 15 /HPF (ref 0–6)

## 2022-03-22 PROCEDURE — 80048 BASIC METABOLIC PNL TOTAL CA: CPT

## 2022-03-22 PROCEDURE — 84484 ASSAY OF TROPONIN QUANT: CPT

## 2022-03-22 PROCEDURE — 82550 ASSAY OF CK (CPK): CPT

## 2022-03-22 PROCEDURE — 93306 TTE W/DOPPLER COMPLETE: CPT

## 2022-03-22 PROCEDURE — C8929 TTE W OR WO FOL WCON,DOPPLER: HCPCS

## 2022-03-22 PROCEDURE — 80320 DRUG SCREEN QUANTALCOHOLS: CPT

## 2022-03-22 PROCEDURE — 87086 URINE CULTURE/COLONY COUNT: CPT

## 2022-03-22 PROCEDURE — 94002 VENT MGMT INPAT INIT DAY: CPT

## 2022-03-22 PROCEDURE — 85007 BL SMEAR W/DIFF WBC COUNT: CPT

## 2022-03-22 PROCEDURE — 82962 GLUCOSE BLOOD TEST: CPT

## 2022-03-22 PROCEDURE — 85610 PROTHROMBIN TIME: CPT

## 2022-03-22 PROCEDURE — 82803 BLOOD GASES ANY COMBINATION: CPT

## 2022-03-22 PROCEDURE — 36600 WITHDRAWAL OF ARTERIAL BLOOD: CPT

## 2022-03-22 PROCEDURE — 94640 AIRWAY INHALATION TREATMENT: CPT

## 2022-03-22 PROCEDURE — 36415 COLL VENOUS BLD VENIPUNCTURE: CPT

## 2022-03-22 PROCEDURE — 87040 BLOOD CULTURE FOR BACTERIA: CPT

## 2022-03-22 PROCEDURE — A9512 TC99M PERTECHNETATE: HCPCS

## 2022-03-22 PROCEDURE — 80053 COMPREHEN METABOLIC PANEL: CPT

## 2022-03-22 PROCEDURE — 93005 ELECTROCARDIOGRAM TRACING: CPT

## 2022-03-22 PROCEDURE — 82553 CREATINE MB FRACTION: CPT

## 2022-03-22 PROCEDURE — 0BH17EZ INSERTION OF ENDOTRACHEAL AIRWAY INTO TRACHEA, VIA NATURAL OR ARTIFICIAL OPENING: ICD-10-PCS | Performed by: HOSPITALIST

## 2022-03-22 PROCEDURE — 80307 DRUG TEST PRSMV CHEM ANLYZR: CPT

## 2022-03-22 PROCEDURE — 80061 LIPID PANEL: CPT

## 2022-03-22 PROCEDURE — 71045 X-RAY EXAM CHEST 1 VIEW: CPT

## 2022-03-22 PROCEDURE — G0480 DRUG TEST DEF 1-7 CLASSES: HCPCS

## 2022-03-22 PROCEDURE — 82805 BLOOD GASES W/O2 SATURATION: CPT

## 2022-03-22 PROCEDURE — 82140 ASSAY OF AMMONIA: CPT

## 2022-03-22 PROCEDURE — 81001 URINALYSIS AUTO W/SCOPE: CPT

## 2022-03-22 PROCEDURE — 85025 COMPLETE CBC W/AUTO DIFF WBC: CPT

## 2022-03-22 PROCEDURE — 94003 VENT MGMT INPAT SUBQ DAY: CPT

## 2022-03-22 PROCEDURE — 5A1935Z RESPIRATORY VENTILATION, LESS THAN 24 CONSECUTIVE HOURS: ICD-10-PCS | Performed by: HOSPITALIST

## 2022-03-22 PROCEDURE — 78601 BRAIN IMAGE W/FLOW < 4 VIEWS: CPT

## 2022-03-22 RX ADMIN — HEPARIN SODIUM SCH UNIT: 5000 INJECTION, SOLUTION INTRAVENOUS; SUBCUTANEOUS at 21:50

## 2022-03-22 RX ADMIN — IPRATROPIUM BROMIDE AND ALBUTEROL SULFATE SCH AMPUL: .5; 3 SOLUTION RESPIRATORY (INHALATION) at 08:30

## 2022-03-22 RX ADMIN — IPRATROPIUM BROMIDE AND ALBUTEROL SULFATE SCH: .5; 3 SOLUTION RESPIRATORY (INHALATION) at 19:37

## 2022-03-22 RX ADMIN — FAMOTIDINE SCH MG: 10 INJECTION, SOLUTION INTRAVENOUS at 14:45

## 2022-03-22 RX ADMIN — DEXTROSE AND SODIUM CHLORIDE SCH MLS/HR: 5; .45 INJECTION, SOLUTION INTRAVENOUS at 22:50

## 2022-03-22 RX ADMIN — Medication SCH ML: at 21:50

## 2022-03-22 RX ADMIN — Medication SCH MLS/HR: at 12:26

## 2022-03-22 RX ADMIN — IPRATROPIUM BROMIDE AND ALBUTEROL SULFATE SCH: .5; 3 SOLUTION RESPIRATORY (INHALATION) at 14:51

## 2022-03-22 RX ADMIN — FAMOTIDINE SCH MG: 10 INJECTION, SOLUTION INTRAVENOUS at 21:49

## 2022-03-22 RX ADMIN — DEXTROSE AND SODIUM CHLORIDE SCH MLS/HR: 5; .45 INJECTION, SOLUTION INTRAVENOUS at 08:40

## 2022-03-22 RX ADMIN — Medication SCH ML: at 14:45

## 2022-03-22 RX ADMIN — DEXTROSE AND SODIUM CHLORIDE SCH MLS/HR: 5; .45 INJECTION, SOLUTION INTRAVENOUS at 15:28

## 2022-03-22 RX ADMIN — HEPARIN SODIUM SCH UNIT: 5000 INJECTION, SOLUTION INTRAVENOUS; SUBCUTANEOUS at 14:45
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
## 2022-03-22 NOTE — NUCLEAR MEDICINE REPORT
NM BRAIN FLOW



HISTORY: Overdose.



COMPARISON: None.



TECHNIQUE: Following administration of radiopharmaceutical, followed by a saline flush, immediate dyn
amic images of the head and neck were acquired in the anterior projection. Subsequently, static image
s of the head were obtained.



RADIOPHARMACEUTICAL: 21 mCi of TC -99 PERTECHNETATE



FINDINGS:

Complete lack of intracranial radiotracer activity. There is increased collateral flow through the ex
ternal carotids to the nose.







IMPRESSION:

1. Absent cerebral perfusion.



Signer Name: Joseph Cottrell MD 

Signed: 3/22/2022 12:46 PM

Workstation Name: [id number]
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
## 2022-03-22 NOTE — ELECTROCARDIOGRAPH REPORT
[facility]

                                       

Test Date:    2022               Test Time:    02:39:57

Pat Name:     CHAVA HURTADO           Department:   

Patient ID:   [id number]          Room:         A257

Gender:       M                        Technician:   BAILEY

:          1975               Requested By: JOSELINE DUFF

Order Number: [id number]              Reading MD:   Eliu Hernandez

                                 Measurements

Intervals                              Axis          

Rate:         119                      P:            -88

MD:           85                       QRS:          91

QRSD:         92                       T:            245

QT:           292                                    

QTc:          411                                    

                           Interpretive Statements

Sinus or ectopic atrial tachycardia

Repol abnrm, global ischemia, diffuse leads

No previous ECG available for comparison

Electronically Signed On 3- 10:57:27 EDT by Eliu Hernandez

## 2022-03-23 VITALS — DIASTOLIC BLOOD PRESSURE: 49 MMHG | SYSTOLIC BLOOD PRESSURE: 97 MMHG

## 2022-03-23 LAB
HCO3 BLDA-SCNC: 25.3 MMOL/L (ref 20–26)
HCO3 BLDA-SCNC: 29.2 MMOL/L (ref 20–26)
PCO2 BLDA: 56.6 MM HG
PCO2 BLDA: 94.6 MM HG
PH BLDA: 7.11 PH UNITS (ref 7.35–7.45)
PH BLDA: 7.27 PH UNITS (ref 7.35–7.45)
PO2 BLDA: 39.6 MM HG (ref 80–90)
PO2 BLDA: 58.7 MM HG (ref 80–90)

## 2022-03-23 PROCEDURE — 4A033R1 MEASUREMENT OF ARTERIAL SATURATION, PERIPHERAL, PERCUTANEOUS APPROACH: ICD-10-PCS | Performed by: HOSPITALIST

## 2022-03-23 RX ADMIN — FAMOTIDINE SCH MG: 10 INJECTION, SOLUTION INTRAVENOUS at 10:44

## 2022-03-23 RX ADMIN — Medication SCH MLS/HR: at 01:02

## 2022-03-23 RX ADMIN — Medication SCH ML: at 10:44

## 2022-03-23 RX ADMIN — DEXTROSE AND SODIUM CHLORIDE SCH MLS/HR: 5; .45 INJECTION, SOLUTION INTRAVENOUS at 12:27

## 2022-03-23 RX ADMIN — IPRATROPIUM BROMIDE AND ALBUTEROL SULFATE SCH AMPUL: .5; 3 SOLUTION RESPIRATORY (INHALATION) at 09:28

## 2022-03-23 RX ADMIN — HEPARIN SODIUM SCH UNIT: 5000 INJECTION, SOLUTION INTRAVENOUS; SUBCUTANEOUS at 10:44

## 2022-03-23 RX ADMIN — DEXTROSE AND SODIUM CHLORIDE SCH MLS/HR: 5; .45 INJECTION, SOLUTION INTRAVENOUS at 05:54

## 2022-03-23 RX ADMIN — IPRATROPIUM BROMIDE AND ALBUTEROL SULFATE SCH: .5; 3 SOLUTION RESPIRATORY (INHALATION) at 02:53

## 2022-03-23 RX ADMIN — IPRATROPIUM BROMIDE AND ALBUTEROL SULFATE SCH: .5; 3 SOLUTION RESPIRATORY (INHALATION) at 14:10

## 2022-03-23 NOTE — PROGRESS NOTE
Assessment and Plan








 Assessment and Plan 


46-year-old male with history of hypertension, asthma, COPD, history of drug 

abuse was brought to the emergency room because of cardiac arrest.  Per EMS the 

patient was found unresponsive by family.  Upon EMS arrival at 01: 57 the 

patient was found to be in asystole.  ACLS protocols were initiated and the 

patient was intubated using a Calos airway.  Patient was administered a total of 

4 mg of Narcan intranasally and 4 rounds of epi and 1 bicarb prior to arrival.  

Last rhythm prior to arrival was PEA per EMS.  Upon arrival to the ED the Calos 

airway was removed and patient was intubated by myself using the glidescope and 

8.0 ET tube.  The patient was found to still be in PEA and 1 round of 

epinephrine and sodium bicarb were given with return of spontaneous circulation.







- Patient Problems


# Post Cardiac arrest


- with delay resuscitation>8-10 minutes was at home


-exam is suggest of sever anoxic brain injury / brain death


-blood flow scan is consistent with above 


- no cranial nerves activity is noted


-caloric test showed no response


-Apnea test is done today for 5 minutes no spontaneous breathing is noted 

HR#120-130


-ABG is done before and after the test with 


-Initial ABG 7.26/56.6/58.7


-After apnea 7.10/94.6/39.6


++ findings are consistent with above finding of brain death 


-findings are related to family 





# Acute respiratory failure


-Patient is on vent.  DuoNeb by nebulizer every 4 hours.  Albuterol by nebulizer

every 4 hours as needed.  Consult critical care evaluation.








# Drug abuse


-Patient has history of alcohol and heroin abuse.  We will consult the patient 

regarding quit taking alcohol and heroin








# Lactic acidosis


-D5 half-normal saline at the rate of 125 cc/h.  Zosyn 4.5 g IV every 8 hours.  

Blood culture sputum culture.  Recheck CBC and lactic acid in the morning








# HTN (hypertension), benign 


-Hydralazine 10 mg IV every 6 hours as needed.  We continue the home medication





# Cardiomyopathy


-Echo is noted with Ef#35% sever hypokinesia





# Asthma


-Patient is on vent.  DuoNeb by nebulizer every 4 hours.  Albuterol via 

nebulizer every 4 hours as needed








# DVT prophylaxis


-Heparin 5000 units subcu every 12 hours for DVT prophylaxis.  Pepcid 20 mg IV 

every 12 hours for GI prophylaxis.  Patient is a full code








case D/W wife and ICU team





Subjective


Date of service: 03/23/22


Interval history: 





pt. evalueted today status is unchanged 


according to wife she noted slight muscles jerking last night and as per 

respiratory team pt. had few spontaneous breathing noted last night 


will do Apnea test 


findings is d/w family and test done in their presence





Objective





- Vital Sign


                               Vital Signs - 12hr











  03/23/22 03/23/22 03/23/22





  00:40 00:50 01:00


 


Temperature   


 


Pulse Rate 99 H 100 H 101 H


 


Pulse Rate [   





Bilateral   





Throughout]   


 


Pulse Rate [   





From Monitor]   


 


Respiratory 24 24 24





Rate   


 


Respiratory   





Rate [Bilateral   





Throughout]   


 


Blood Pressure 115/70 114/75 107/72


 


O2 Sat by Pulse 100 100 





Oximetry   














  03/23/22 03/23/22 03/23/22





  01:10 01:20 01:30


 


Temperature   


 


Pulse Rate 103 H 104 H 103 H


 


Pulse Rate [   





Bilateral   





Throughout]   


 


Pulse Rate [   





From Monitor]   


 


Respiratory 24 24 24





Rate   


 


Respiratory   





Rate [Bilateral   





Throughout]   


 


Blood Pressure 107/72 100/67 110/71


 


O2 Sat by Pulse 100 100 





Oximetry   














  03/23/22 03/23/22 03/23/22





  01:40 01:50 02:00


 


Temperature   


 


Pulse Rate 104 H 105 H 104 H


 


Pulse Rate [   





Bilateral   





Throughout]   


 


Pulse Rate [   





From Monitor]   


 


Respiratory 24 24 24





Rate   


 


Respiratory   





Rate [Bilateral   





Throughout]   


 


Blood Pressure 110/71 102/71 100/66


 


O2 Sat by Pulse 100 100 99





Oximetry   














  03/23/22 03/23/22 03/23/22





  02:10 02:20 02:30


 


Temperature   


 


Pulse Rate 106 H 108 H 107 H


 


Pulse Rate [   





Bilateral   





Throughout]   


 


Pulse Rate [   





From Monitor]   


 


Respiratory 18 12 24





Rate   


 


Respiratory   





Rate [Bilateral   





Throughout]   


 


Blood Pressure 100/66 96/62 98/62


 


O2 Sat by Pulse 98 98 





Oximetry   














  03/23/22 03/23/22 03/23/22





  02:40 02:50 03:00


 


Temperature   


 


Pulse Rate 109 H 110 H 109 H


 


Pulse Rate [   





Bilateral   





Throughout]   


 


Pulse Rate [   





From Monitor]   


 


Respiratory 21 18 24





Rate   


 


Respiratory   





Rate [Bilateral   





Throughout]   


 


Blood Pressure 98/62 96/63 112/51


 


O2 Sat by Pulse 100 97 





Oximetry   














  03/23/22 03/23/22 03/23/22





  03:10 03:20 03:30


 


Temperature   


 


Pulse Rate 110 H 109 H 107 H


 


Pulse Rate [   





Bilateral   





Throughout]   


 


Pulse Rate [   





From Monitor]   


 


Respiratory 25 H 24 24





Rate   


 


Respiratory   





Rate [Bilateral   





Throughout]   


 


Blood Pressure 112/51 114/62 123/65


 


O2 Sat by Pulse 97 98 98





Oximetry   














  03/23/22 03/23/22 03/23/22





  03:40 03:50 04:00


 


Temperature   99.5 F


 


Pulse Rate 108 H 108 H 110 H


 


Pulse Rate [   





Bilateral   





Throughout]   


 


Pulse Rate [   90





From Monitor]   


 


Respiratory 24 25 H 24





Rate   


 


Respiratory   





Rate [Bilateral   





Throughout]   


 


Blood Pressure 123/65 98/61 98/65


 


O2 Sat by Pulse 99 98 100





Oximetry   














  03/23/22 03/23/22 03/23/22





  04:10 04:20 04:29


 


Temperature   


 


Pulse Rate 110 H 112 H 114 H


 


Pulse Rate [   





Bilateral   





Throughout]   


 


Pulse Rate [   





From Monitor]   


 


Respiratory 24 21 





Rate   


 


Respiratory   





Rate [Bilateral   





Throughout]   


 


Blood Pressure 98/65 107/68 116/62


 


O2 Sat by Pulse 98 98 94





Oximetry   














  03/23/22 03/23/22 03/23/22





  04:30 04:40 04:50


 


Temperature   


 


Pulse Rate 113 H 116 H 113 H


 


Pulse Rate [   





Bilateral   





Throughout]   


 


Pulse Rate [   





From Monitor]   


 


Respiratory 25 H 24 24





Rate   


 


Respiratory   





Rate [Bilateral   





Throughout]   


 


Blood Pressure 116/62 116/62 99/53


 


O2 Sat by Pulse 96 94 95





Oximetry   














  03/23/22 03/23/22 03/23/22





  05:00 05:10 05:20


 


Temperature   


 


Pulse Rate 117 H 117 H 116 H


 


Pulse Rate [   





Bilateral   





Throughout]   


 


Pulse Rate [   





From Monitor]   


 


Respiratory 24 24 24





Rate   


 


Respiratory   





Rate [Bilateral   





Throughout]   


 


Blood Pressure 99/53 115/63 111/59


 


O2 Sat by Pulse 94 94 93





Oximetry   














  03/23/22 03/23/22 03/23/22





  05:30 05:40 05:50


 


Temperature   


 


Pulse Rate 116 H 114 H 115 H


 


Pulse Rate [   





Bilateral   





Throughout]   


 


Pulse Rate [   





From Monitor]   


 


Respiratory 25 H 24 24





Rate   


 


Respiratory   





Rate [Bilateral   





Throughout]   


 


Blood Pressure 99/50 99/50 100/54


 


O2 Sat by Pulse 89 94 92





Oximetry   














  03/23/22 03/23/22 03/23/22





  06:00 06:10 06:20


 


Temperature   


 


Pulse Rate 113 H 114 H 114 H


 


Pulse Rate [   





Bilateral   





Throughout]   


 


Pulse Rate [   





From Monitor]   


 


Respiratory 24 24 24





Rate   


 


Respiratory   





Rate [Bilateral   





Throughout]   


 


Blood Pressure 98/67 98/67 96/54


 


O2 Sat by Pulse  92 91





Oximetry   














  03/23/22 03/23/22 03/23/22





  06:30 06:40 06:50


 


Temperature   


 


Pulse Rate 116 H 117 H 118 H


 


Pulse Rate [   





Bilateral   





Throughout]   


 


Pulse Rate [   





From Monitor]   


 


Respiratory 23 24 24





Rate   


 


Respiratory   





Rate [Bilateral   





Throughout]   


 


Blood Pressure 97/57 97/57 99/69


 


O2 Sat by Pulse  92 92





Oximetry   














  03/23/22 03/23/22 03/23/22





  07:00 07:10 07:16


 


Temperature   100.8 F H


 


Pulse Rate 118 H 119 H 


 


Pulse Rate [   





Bilateral   





Throughout]   


 


Pulse Rate [   





From Monitor]   


 


Respiratory 24 25 H 





Rate   


 


Respiratory   





Rate [Bilateral   





Throughout]   


 


Blood Pressure 110/69 110/69 


 


O2 Sat by Pulse 93 92 





Oximetry   














  03/23/22 03/23/22 03/23/22





  07:20 07:30 07:40


 


Temperature   


 


Pulse Rate 118 H 119 H 121 H


 


Pulse Rate [   





Bilateral   





Throughout]   


 


Pulse Rate [   





From Monitor]   


 


Respiratory 24 24 25 H





Rate   


 


Respiratory   





Rate [Bilateral   





Throughout]   


 


Blood Pressure 112/85 112/85 100/68


 


O2 Sat by Pulse 92 92 91





Oximetry   














  03/23/22 03/23/22 03/23/22





  07:50 08:00 08:10


 


Temperature  100.8 F H 


 


Pulse Rate 121 H 121 H 122 H


 


Pulse Rate [   





Bilateral   





Throughout]   


 


Pulse Rate [  124 H 





From Monitor]   


 


Respiratory 24 24 24





Rate   


 


Respiratory   





Rate [Bilateral   





Throughout]   


 


Blood Pressure 103/75 99/58 117/72


 


O2 Sat by Pulse 90 89 93





Oximetry   














  03/23/22 03/23/22 03/23/22





  08:20 08:30 08:40


 


Temperature   


 


Pulse Rate 122 H 123 H 124 H


 


Pulse Rate [   





Bilateral   





Throughout]   


 


Pulse Rate [   





From Monitor]   


 


Respiratory 24 24 24





Rate   


 


Respiratory   





Rate [Bilateral   





Throughout]   


 


Blood Pressure 117/72 115/59 115/59


 


O2 Sat by Pulse  89 88





Oximetry   














  03/23/22 03/23/22 03/23/22





  08:50 09:00 09:10


 


Temperature   


 


Pulse Rate 124 H 122 H 123 H


 


Pulse Rate [   





Bilateral   





Throughout]   


 


Pulse Rate [   





From Monitor]   


 


Respiratory 24 24 25 H





Rate   


 


Respiratory   





Rate [Bilateral   





Throughout]   


 


Blood Pressure 120/58 122/62 122/62


 


O2 Sat by Pulse 89 89 88





Oximetry   














  03/23/22 03/23/22 03/23/22





  09:20 09:30 09:39


 


Temperature   


 


Pulse Rate 125 H 124 H 


 


Pulse Rate [   122 H





Bilateral   





Throughout]   


 


Pulse Rate [   





From Monitor]   


 


Respiratory 23 24 





Rate   


 


Respiratory   24





Rate [Bilateral   





Throughout]   


 


Blood Pressure 118/86 118/86 


 


O2 Sat by Pulse 90 89 





Oximetry   














  03/23/22 03/23/22 03/23/22





  09:40 09:50 10:00


 


Temperature   


 


Pulse Rate 125 H 125 H 126 H


 


Pulse Rate [   





Bilateral   





Throughout]   


 


Pulse Rate [   





From Monitor]   


 


Respiratory 24 24 25 H





Rate   


 


Respiratory   





Rate [Bilateral   





Throughout]   


 


Blood Pressure 99/58 97/70 97/70


 


O2 Sat by Pulse 86 88 90





Oximetry   














  03/23/22 03/23/22 03/23/22





  10:10 10:20 10:30


 


Temperature   


 


Pulse Rate 127 H 126 H 126 H


 


Pulse Rate [   





Bilateral   





Throughout]   


 


Pulse Rate [   





From Monitor]   


 


Respiratory 25 H 24 0 L





Rate   


 


Respiratory   





Rate [Bilateral   





Throughout]   


 


Blood Pressure 120/50 91/64 91/64


 


O2 Sat by Pulse 87 87 88





Oximetry   














  03/23/22 03/23/22 03/23/22





  10:40 10:50 11:00


 


Temperature   


 


Pulse Rate 130 H 130 H 130 H


 


Pulse Rate [   





Bilateral   





Throughout]   


 


Pulse Rate [   





From Monitor]   


 


Respiratory 24 25 H 24





Rate   


 


Respiratory   





Rate [Bilateral   





Throughout]   


 


Blood Pressure 118/74 114/66 90/59


 


O2 Sat by Pulse 100 97 93





Oximetry   














  03/23/22 03/23/22 03/23/22





  11:10 11:20 11:30


 


Temperature   


 


Pulse Rate 129 H 129 H 129 H


 


Pulse Rate [   





Bilateral   





Throughout]   


 


Pulse Rate [   





From Monitor]   


 


Respiratory 24 22 24





Rate   


 


Respiratory   





Rate [Bilateral   





Throughout]   


 


Blood Pressure 90/59 91/65 99/57


 


O2 Sat by Pulse 94 91 88





Oximetry   














  03/23/22 03/23/22 03/23/22





  11:40 11:50 12:00


 


Temperature   101.2 F H


 


Pulse Rate 129 H 129 H 127 H


 


Pulse Rate [   





Bilateral   





Throughout]   


 


Pulse Rate [   127 H





From Monitor]   


 


Respiratory 24 24 24





Rate   


 


Respiratory   





Rate [Bilateral   





Throughout]   


 


Blood Pressure 99/57 109/87 103/56


 


O2 Sat by Pulse 86 86 87





Oximetry   














- General Apperance


Constitutional: comfortable





- Respiratory


Respiratory: lungs clear, rhonchi





- Cardiovascular


Cardiovascular: regular rate, normal S1, normal S2


Extremities: no peripheral edema bilat, no clubbing, cyanosis





- Gastrointestinal


Gastrointestinal: normoactive bowel sounds





- Integumentary


Integumentary: normal





- Neurologic


Cranial nerve examination: other (NO sponateous breathing , no gag,no EOM ,no 

corneal reflexes , no movment is noted )


Detailed motor examination: other (no movment is noted)





- Laboratory Findings


CBC and BMP: 


                                 03/22/22 03:21





                                 03/22/22 05:47


Abnormal Lab Findings: 


                                  Abnormal Labs











  03/22/22 03/22/22 03/22/22





  03:06 03:06 03:21


 


MCV    95 H


 


Seg Neuts % (Manual)    71.0 H


 


PT  15.3 H  


 


ABG pH   


 


ABG pO2   


 


ABG HCO3   


 


ABG O2 Saturation   


 


ABG Base Excess   


 


VBG pH   


 


Oxyhemoglobin   


 


Potassium   


 


Chloride   


 


Carbon Dioxide   17 L 


 


BUN   


 


Creatinine   1.7 H 


 


Glucose   192 H 


 


POC Glucose   


 


Lactic Acid   


 


Calcium   8.3 L 


 


AST   217 H 


 


ALT   174 H 


 


Total Creatine Kinase   658 H 


 


CK-MB (CK-2)   6.3 H 


 


Troponin T   


 


Total Protein   6.2 L 


 


Albumin   3.5 L 


 


LDL Cholesterol Direct   


 


Urine Blood   


 


Urine WBC (Auto)   














  03/22/22 03/22/22 03/22/22





  03:21 03:21 04:36


 


MCV   


 


Seg Neuts % (Manual)   


 


PT   


 


ABG pH   


 


ABG pO2   


 


ABG HCO3   


 


ABG O2 Saturation   


 


ABG Base Excess   


 


VBG pH   7.150 L* 


 


Oxyhemoglobin   


 


Potassium   


 


Chloride   


 


Carbon Dioxide   


 


BUN   


 


Creatinine   


 


Glucose   


 


POC Glucose   


 


Lactic Acid  10.70 H*  


 


Calcium   


 


AST   


 


ALT   


 


Total Creatine Kinase   


 


CK-MB (CK-2)   


 


Troponin T   


 


Total Protein   


 


Albumin   


 


LDL Cholesterol Direct   


 


Urine Blood    Small A


 


Urine WBC (Auto)    15.0 H














  03/22/22 03/22/22 03/22/22





  05:12 05:47 08:12


 


MCV   


 


Seg Neuts % (Manual)   


 


PT   


 


ABG pH    7.579 H


 


ABG pO2    206.2 H


 


ABG HCO3    26.7 H


 


ABG O2 Saturation    99.4 H


 


ABG Base Excess    5.7 H


 


VBG pH   


 


Oxyhemoglobin   


 


Potassium   5.5 H 


 


Chloride   97.0 L 


 


Carbon Dioxide   


 


BUN   22 H 


 


Creatinine   1.9 H 


 


Glucose   131 H 


 


POC Glucose   


 


Lactic Acid  4.90 H*  


 


Calcium   


 


AST   


 


ALT   


 


Total Creatine Kinase   


 


CK-MB (CK-2)   


 


Troponin T   


 


Total Protein   


 


Albumin   


 


LDL Cholesterol Direct   


 


Urine Blood   


 


Urine WBC (Auto)   














  03/22/22 03/22/22 03/22/22





  10:50 10:50 23:17


 


MCV   


 


Seg Neuts % (Manual)   


 


PT   


 


ABG pH   


 


ABG pO2   


 


ABG HCO3   


 


ABG O2 Saturation   


 


ABG Base Excess   


 


VBG pH   


 


Oxyhemoglobin   


 


Potassium   


 


Chloride   


 


Carbon Dioxide   


 


BUN   


 


Creatinine   


 


Glucose   


 


POC Glucose    113 H


 


Lactic Acid   4.50 H* 


 


Calcium   


 


AST   


 


ALT   


 


Total Creatine Kinase   


 


CK-MB (CK-2)   


 


Troponin T  0.353 H* D  


 


Total Protein   


 


Albumin   


 


LDL Cholesterol Direct  38 L  


 


Urine Blood   


 


Urine WBC (Auto)   














  03/22/22 03/23/22 03/23/22





  23:30 10:15 10:39


 


MCV   


 


Seg Neuts % (Manual)   


 


PT   


 


ABG pH   7.269 L  7.107 L*


 


ABG pO2   58.7 L  39.6 L*


 


ABG HCO3    29.2 H


 


ABG O2 Saturation   86.8 L  54.7 L


 


ABG Base Excess   -2.5 L  -3.1 L


 


VBG pH   


 


Oxyhemoglobin   85.4 L  53.7 L


 


Potassium   


 


Chloride   


 


Carbon Dioxide   


 


BUN   


 


Creatinine   


 


Glucose   


 


POC Glucose   


 


Lactic Acid  3.80 H*  


 


Calcium   


 


AST   


 


ALT   


 


Total Creatine Kinase   


 


CK-MB (CK-2)   


 


Troponin T   


 


Total Protein   


 


Albumin   


 


LDL Cholesterol Direct   


 


Urine Blood   


 


Urine WBC (Auto)